# Patient Record
Sex: FEMALE | Race: WHITE | NOT HISPANIC OR LATINO | Employment: STUDENT | URBAN - METROPOLITAN AREA
[De-identification: names, ages, dates, MRNs, and addresses within clinical notes are randomized per-mention and may not be internally consistent; named-entity substitution may affect disease eponyms.]

---

## 2017-08-04 ENCOUNTER — HOSPITAL ENCOUNTER (EMERGENCY)
Facility: HOSPITAL | Age: 22
Discharge: HOME | End: 2017-08-04
Payer: COMMERCIAL

## 2017-08-04 VITALS
HEART RATE: 122 BPM | TEMPERATURE: 99.8 F | RESPIRATION RATE: 24 BRPM | SYSTOLIC BLOOD PRESSURE: 134 MMHG | OXYGEN SATURATION: 98 % | DIASTOLIC BLOOD PRESSURE: 78 MMHG | WEIGHT: 104 LBS

## 2017-08-04 DIAGNOSIS — S61.411A LACERATION OF RIGHT HAND: Primary | ICD-10-CM

## 2017-08-04 PROCEDURE — 99282 EMERGENCY DEPT VISIT SF MDM: CPT

## 2017-08-04 RX ORDER — LIDOCAINE HYDROCHLORIDE 10 MG/ML
10 INJECTION, SOLUTION EPIDURAL; INFILTRATION; INTRACAUDAL; PERINEURAL ONCE
Status: COMPLETED | OUTPATIENT
Start: 2017-08-04 | End: 2017-08-04

## 2017-08-04 RX ORDER — GINSENG 100 MG
1 CAPSULE ORAL ONCE
Status: COMPLETED | OUTPATIENT
Start: 2017-08-04 | End: 2017-08-04

## 2017-08-04 RX ADMIN — LIDOCAINE HYDROCHLORIDE 10 ML: 10 INJECTION, SOLUTION EPIDURAL; INFILTRATION; INTRACAUDAL; PERINEURAL at 13:30

## 2017-08-04 RX ADMIN — BACITRACIN ZINC 1 SMALL APPLICATION: 500 OINTMENT TOPICAL at 13:30

## 2018-01-16 NOTE — PROGRESS NOTES
Chief Complaint  PPD READ NEGATIVE 0 00MM BH/LPN      Active Problems    1   Encounter for PPD test (V74 1) (Z11 1)    Signatures   Electronically signed by : MAHESH Toirbio ; Jan 29 2016  7:23AM EST                       (Author)

## 2023-07-25 ENCOUNTER — OFFICE VISIT (OUTPATIENT)
Dept: URGENT CARE | Facility: CLINIC | Age: 28
End: 2023-07-25
Payer: COMMERCIAL

## 2023-07-25 VITALS
RESPIRATION RATE: 18 BRPM | BODY MASS INDEX: 23.03 KG/M2 | HEIGHT: 61 IN | WEIGHT: 122 LBS | HEART RATE: 71 BPM | OXYGEN SATURATION: 99 % | TEMPERATURE: 98.4 F

## 2023-07-25 DIAGNOSIS — H61.23 BILATERAL IMPACTED CERUMEN: Primary | ICD-10-CM

## 2023-07-25 PROCEDURE — 69209 REMOVE IMPACTED EAR WAX UNI: CPT | Performed by: PHYSICIAN ASSISTANT

## 2023-07-25 PROCEDURE — 99213 OFFICE O/P EST LOW 20 MIN: CPT | Performed by: PHYSICIAN ASSISTANT

## 2023-07-25 NOTE — PROGRESS NOTES
[facility]        NAME: Asia Delcid is a 32 y.o. female  : 1995    MRN: [id number]  DATE: 2023  TIME: 5:29 PM    Assessment and Plan   Bilateral impacted cerumen [H61.23]  1. Bilateral impacted cerumen              Patient Instructions   Patient Instructions   Wax removed today        Follow up with PCP in 3-5 days. Proceed to  ER if symptoms worsen. Chief Complaint     Chief Complaint   Patient presents with   • Cerumen Impaction     Left ear cerumen impaction used Debrox Gtts but couldn't clear it. History of Present Illness       The patient is a 29-year-old female presenting today for cerumen impaction B/L. She has tried using Debrox drops over-the-counter but feels like that made it worse. Review of Systems   Review of Systems   Constitutional: Negative for activity change, appetite change, chills, fatigue and fever. HENT: Positive for hearing loss. Negative for congestion, ear discharge, ear pain, rhinorrhea, sinus pressure, sinus pain and sore throat. Eyes: Negative for pain and visual disturbance. Respiratory: Negative for cough, chest tightness and shortness of breath. Cardiovascular: Negative for chest pain and palpitations. Gastrointestinal: Negative for abdominal pain, diarrhea, nausea and vomiting. Genitourinary: Negative for dysuria and hematuria. Musculoskeletal: Negative for arthralgias, back pain and myalgias. Skin: Negative for color change, pallor and rash. Neurological: Negative for seizures, syncope and headaches. All other systems reviewed and are negative.         Current Medications       Current Outpatient Medications:   •  norethindrone-ethinyl estradiol-iron (ESTROSTEP FE) 1-20/1-30/1-35 MG-MCG TABS, Take by mouth daily (Patient not taking: Reported on 2023), Disp: , Rfl:     Current Allergies     Allergies as of 2023   • (No Known Allergies)            The following portions of the patient's history were reviewed and updated as appropriate: allergies, current medications, past family history, past medical history, past social history, past surgical history and problem list.     No past medical history on file. No past surgical history on file. No family history on file. Medications have been verified. Objective   Pulse 71   Temp 98.4 °F (36.9 °C)   Resp 18   Ht 5' 1" (1.549 m)   Wt 55.3 kg (122 lb)   LMP 07/20/2023   SpO2 99%   BMI 23.05 kg/m²        Physical Exam     Physical Exam  Vitals and nursing note reviewed. Constitutional:       General: She is not in acute distress. Appearance: Normal appearance. She is normal weight. She is not ill-appearing, toxic-appearing or diaphoretic. HENT:      Right Ear: There is impacted cerumen. Left Ear: There is impacted cerumen. Cardiovascular:      Rate and Rhythm: Normal rate and regular rhythm. Pulmonary:      Effort: Pulmonary effort is normal.      Breath sounds: Normal breath sounds. Musculoskeletal:         General: Normal range of motion. Skin:     General: Skin is warm. Neurological:      Mental Status: She is alert. Ear cerumen removal    Date/Time: 7/25/2023 4:30 PM    Performed by: Sangita Persaud PA-C  Authorized by: Sangita Persaud PA-C  Universal Protocol:  Procedure performed by: Myles CHAPMAN)  Consent: Verbal consent obtained.   Risks and benefits: risks, benefits and alternatives were discussed  Consent given by: patient  Patient understanding: patient states understanding of the procedure being performed  Patient consent: the patient's understanding of the procedure matches consent given  Procedure consent: procedure consent matches procedure scheduled  Patient identity confirmed: verbally with patient      Patient location:  Clinic  Procedure details:     Local anesthetic:  None    Location:  L ear and R ear    Procedure type: irrigation only      Approach:  External  Post-procedure details: Complication:  None    Hearing quality:  Improved    Patient tolerance of procedure:   Tolerated well, no immediate complications

## 2024-02-27 ENCOUNTER — APPOINTMENT (OUTPATIENT)
Dept: RADIOLOGY | Facility: CLINIC | Age: 29
End: 2024-02-27
Payer: COMMERCIAL

## 2024-02-27 ENCOUNTER — OFFICE VISIT (OUTPATIENT)
Dept: URGENT CARE | Facility: CLINIC | Age: 29
End: 2024-02-27
Payer: COMMERCIAL

## 2024-02-27 VITALS
SYSTOLIC BLOOD PRESSURE: 100 MMHG | RESPIRATION RATE: 18 BRPM | OXYGEN SATURATION: 100 % | TEMPERATURE: 98.4 F | WEIGHT: 123 LBS | DIASTOLIC BLOOD PRESSURE: 68 MMHG | BODY MASS INDEX: 23.22 KG/M2 | HEIGHT: 61 IN | HEART RATE: 86 BPM

## 2024-02-27 DIAGNOSIS — M77.8 TENDINITIS OF LEFT TRICEPS: Primary | ICD-10-CM

## 2024-02-27 DIAGNOSIS — M25.522 ELBOW PAIN, LEFT: ICD-10-CM

## 2024-02-27 PROCEDURE — 73080 X-RAY EXAM OF ELBOW: CPT

## 2024-02-27 PROCEDURE — 99213 OFFICE O/P EST LOW 20 MIN: CPT | Performed by: FAMILY MEDICINE

## 2024-02-27 RX ORDER — VALACYCLOVIR HYDROCHLORIDE 500 MG/1
500 TABLET, FILM COATED ORAL 2 TIMES DAILY PRN
COMMUNITY

## 2024-02-27 RX ORDER — METHYLPREDNISOLONE 4 MG/1
TABLET ORAL
Qty: 21 EACH | Refills: 0 | Status: SHIPPED | OUTPATIENT
Start: 2024-02-27

## 2024-02-27 NOTE — LETTER
February 27, 2024     Patient: Rhea Diaz   YOB: 1995   Date of Visit: 2/27/2024       To Whom It May Concern:    hRea Diaz was evaluated in my office on 2/27/2024.  Sustained a left triceps tendinitis and has initiated treatment.  Will require a sling for her left arm for about 1 week.  For this period of time, she should avoid lifting anything over 5 pounds with her left hand or doing any work that requires frequent elbow movement.  If you have any questions or concerns, please don't hesitate to call.         Sincerely,        Elvis Ly MD

## 2024-02-27 NOTE — PROGRESS NOTES
[facility]        NAME: Rhea Diaz is a 28 y.o. female  : 1995    MRN: [id number]  DATE: 2024  TIME: 5:35 PM    Assessment and Plan   Tendinitis of left triceps [M77.8]  1. Tendinitis of left triceps  XR elbow 3+ vw left    methylPREDNISolone 4 MG tablet therapy pack    Ambulatory Referral to Orthopedic Surgery        Left elbow x-ray with a dome-like, posterior, supracondylar bony projection on the lateral view; official read pending.  Bony projection likely chronic and causing a triceps tendinitis.  Will rest the left elbow in a sling and treat with Medrol Dosepak and icing.  Referred to Ortho for further evaluation and management.    Patient Instructions     Follow up with PCP in 3-5 days.  Proceed to  ER if symptoms worsen.    Chief Complaint     Chief Complaint   Patient presents with    Elbow Pain     X 1 MONTH NOTES l ELBOW PAIN (POSTERIOR ASPECT) WHEN EXTENDS. TRIED ELBOW WRAP AND TAKING ALEVE PRN AND ICE. IS R HAND DOMINANT.          History of Present Illness       28-year-old right-hand-dominant female presents today due to a left elbow strain first developed about 1 month ago which has persisted despite taking Aleve, icing and Ace wrapping.  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  Denies overt pain at rest, but feels a burning sensation with active extension adjacent to the left olecranon.  Denies any distal paresthesias.    Elbow Pain  Associated symptoms include arthralgias. Pertinent negatives include no chest pain, chills, coughing, fever, joint swelling, rash or weakness.       Review of Systems   Review of Systems   Constitutional:  Negative for chills and fever.   Respiratory:  Negative for cough.    Cardiovascular:  Negative for chest pain.   Musculoskeletal:  Positive for arthralgias. Negative for joint swelling.   Skin:  Negative for rash.   Neurological:  Negative for weakness.         Current  "Medications       Current Outpatient Medications:     methylPREDNISolone 4 MG tablet therapy pack, Use as directed on package, Disp: 21 each, Rfl: 0    valACYclovir (Valtrex) 500 mg tablet, Take 500 mg by mouth 2 (two) times a day as needed, Disp: , Rfl:     Current Allergies     Allergies as of 02/27/2024    (No Known Allergies)            The following portions of the patient's history were reviewed and updated as appropriate: allergies, current medications, past family history, past medical history, past social history, past surgical history and problem list.     History reviewed. No pertinent past medical history.    History reviewed. No pertinent surgical history.    No family history on file.      Medications have been verified.        Objective   /68   Pulse 86   Temp 98.4 °F (36.9 °C)   Resp 18   Ht 5' 1\" (1.549 m)   Wt 55.8 kg (123 lb)   LMP 02/24/2024 (Exact Date)   SpO2 100%   BMI 23.24 kg/m²   Patient's last menstrual period was 02/24/2024 (exact date).       Physical Exam     Physical Exam  Vitals and nursing note reviewed.   Constitutional:       General: She is in acute distress.      Appearance: Normal appearance. She is normal weight. She is not ill-appearing, toxic-appearing or diaphoretic.   HENT:      Head: Normocephalic and atraumatic.   Eyes:      General:         Right eye: No discharge.         Left eye: No discharge.      Conjunctiva/sclera: Conjunctivae normal.   Pulmonary:      Effort: Pulmonary effort is normal.      Breath sounds: Normal breath sounds.   Abdominal:      General: Abdomen is flat.   Musculoskeletal:         General: Signs of injury present. No swelling or tenderness.      Comments: Full active ROM of the left elbow with some apprehension with full extension.  No pain or tenderness involving the epicondyles even with resisted wrist flexion and extension.  Pain reproduced with resisted elbow extension.  Denies the sensation of any joint obstruction.   Skin:     " General: Skin is warm.      Findings: No erythema.   Neurological:      General: No focal deficit present.      Mental Status: She is alert and oriented to person, place, and time.   Psychiatric:         Mood and Affect: Mood normal.         Behavior: Behavior normal.         Thought Content: Thought content normal.         Judgment: Judgment normal.

## 2024-02-28 ENCOUNTER — TELEPHONE (OUTPATIENT)
Age: 29
End: 2024-02-28

## 2024-02-28 NOTE — TELEPHONE ENCOUNTER
Patient was calling to schedule a np appt in [place]. Daly came up with poor connection unable to hear patient call disconnected

## 2024-03-04 ENCOUNTER — OFFICE VISIT (OUTPATIENT)
Dept: OBGYN CLINIC | Facility: CLINIC | Age: 29
End: 2024-03-04
Payer: COMMERCIAL

## 2024-03-04 VITALS
HEIGHT: 61 IN | BODY MASS INDEX: 23.22 KG/M2 | SYSTOLIC BLOOD PRESSURE: 101 MMHG | WEIGHT: 123 LBS | DIASTOLIC BLOOD PRESSURE: 68 MMHG | HEART RATE: 70 BPM

## 2024-03-04 DIAGNOSIS — M77.8 TENDINITIS OF LEFT TRICEPS: ICD-10-CM

## 2024-03-04 PROCEDURE — 99203 OFFICE O/P NEW LOW 30 MIN: CPT | Performed by: ORTHOPAEDIC SURGERY

## 2024-03-04 NOTE — PROGRESS NOTES
Assessment/Plan:  1. Tendinitis of left triceps  Ambulatory Referral to Orthopedic Surgery        Scribe Attestation      I,:  Meli Abreu am acting as a scribe while in the presence of the attending physician.:       I,:  Oniel Lee MD personally performed the services described in this documentation    as scribed in my presence.:             28 y.o. with acute left elbow pain. Upon review of the left elbow x-ray, a thorough history and my examination, Rhea is presenting with signs and symptoms consistent with triceps tendonitis. Diagnosis, treatment options and associated risks were discussed with the patient including no treatment, nonsurgical treatment and potential for surgical intervention.  The patient was given the opportunity to ask questions regarding each. There was a bony prominence seen on the lateral view at the time patient was seen at Urgent Care.  Though this is possibly a benign bony lesion such as an osteochondroma, I feel it is more likely due to the angle of the x-ray.  Either way I did recommend repeat x-ray in 3 months to evaluate this area.  In the meantime I recommend she discontinue use of sling, activities as tolerated. Discussed use of voltaren gel and aleve as needed. Patient would like to hold off on physical therapy at this time. Follow up in 3 months we will repeat x-ray on arrival.         Subjective:   Rhea Diaz is a 28 y.o. female who presents for initial evaluation of left elbow pain. Pain started approximately 1 month ago with out injury or trauma. Pain is localized to the posterior elbow, aggravated with elbow extension. Patient was seen at Urgent Care 2/27/2024, x-rays were obtained demonstrating no acute osseous abnormalities. Urgent care placed patient in a sling for 1 week and prescribed medrol dose pack.  Today patient notes continued posterior elbow pain most aggravated when she extends her arm. Pain does not radiate. She denies  limitations in range of motion or strength. Denies numbness or paresthesias.       Review of Systems   Constitutional:  Negative for chills and fever.   HENT:  Negative for ear pain and sore throat.    Eyes:  Negative for pain and visual disturbance.   Respiratory:  Negative for cough and shortness of breath.    Cardiovascular:  Negative for chest pain and palpitations.   Gastrointestinal:  Negative for abdominal pain and vomiting.   Genitourinary:  Negative for dysuria and hematuria.   Musculoskeletal:  Negative for arthralgias and back pain.   Skin:  Negative for color change and rash.   Neurological:  Negative for seizures and syncope.   All other systems reviewed and are negative.        History reviewed. No pertinent past medical history.    History reviewed. No pertinent surgical history.    No family history on file.    Social History     Occupational History    Not on file   Tobacco Use    Smoking status: Never    Smokeless tobacco: Not on file   Substance and Sexual Activity    Alcohol use: Yes     Comment: socially    Drug use: No    Sexual activity: Not on file         Current Outpatient Medications:     valACYclovir (Valtrex) 500 mg tablet, Take 500 mg by mouth 2 (two) times a day as needed, Disp: , Rfl:     methylPREDNISolone 4 MG tablet therapy pack, Use as directed on package (Patient not taking: Reported on 3/4/2024), Disp: 21 each, Rfl: 0    No Known Allergies    Objective:  Vitals:    03/04/24 1136   BP: 101/68   Pulse: 70       Left Elbow Exam     Tenderness   The patient is experiencing no tenderness.     Range of Motion   Extension:  0   Flexion:  140   Pronation:  20     Muscle Strength   Pronation:  5/5   Supination:  5/5     Tests   Varus: negative  Valgus: negative    Other   Erythema: absent  Sensation: normal  Pulse: present    Comments:  5/5 strength in flexion and extension  No anatomical deformity  Skin is warm and dry to touch with no signs of erythema, ecchymosis, or infection   no  soft tissue swelling or effusion noted  no palpable crepitus with passive motion  MMT: 5/5 throughout  Demonstrates normal shoulder, wrist, and finger motion  no radial head instability  no ulnar nerve subluxation  2+ distal radial pulse with brisk capillary refill to the fingers  Radial, median, and ulnar motor and sensory distrubution intact  Sensation to light touch intact distally               Physical Exam  Vitals and nursing note reviewed.   HENT:      Head: Normocephalic.   Eyes:      Extraocular Movements: Extraocular movements intact.   Cardiovascular:      Rate and Rhythm: Normal rate.      Pulses: Normal pulses.   Pulmonary:      Effort: Pulmonary effort is normal.   Musculoskeletal:         General: Normal range of motion.      Cervical back: Normal range of motion.   Skin:     General: Skin is warm and dry.   Neurological:      General: No focal deficit present.      Mental Status: She is alert.   Psychiatric:         Behavior: Behavior normal.         I have personally reviewed pertinent films in PACS and my interpretation is as follows:    X-rays of the left elbow obtained 2/27/2024 demonstrate no acute osseous abnormalities      This document was created using speech voice recognition software.   Grammatical errors, random word insertions, pronoun errors, and incomplete sentences are an occasional consequence of this system due to software limitations, ambient noise, and hardware issues.   Any formal questions or concerns about content, text, or information contained within the body of this dictation should be directly addressed to the provider for clarification.

## 2024-03-04 NOTE — LETTER
March 4, 2024     Patient: Rhea Diaz  YOB: 1995  Date of Visit: 3/4/2024      To Whom it May Concern:    Rhea Diaz is under my professional care. Rhea Valdovinos was seen in my office on 3/4/2024. Rhea Valdovinos may return to work with out restriction.     If you have any questions or concerns, please don't hesitate to call.         Sincerely,          Oniel Lee MD        CC: No Recipients

## 2024-04-03 ENCOUNTER — APPOINTMENT (OUTPATIENT)
Dept: URGENT CARE | Facility: MEDICAL CENTER | Age: 29
End: 2024-04-03

## 2024-04-03 ENCOUNTER — APPOINTMENT (OUTPATIENT)
Dept: PHYSICAL THERAPY | Facility: MEDICAL CENTER | Age: 29
End: 2024-04-03

## 2024-04-03 PROCEDURE — 97530 THERAPEUTIC ACTIVITIES: CPT | Performed by: PHYSICAL THERAPIST

## 2024-06-03 ENCOUNTER — OFFICE VISIT (OUTPATIENT)
Dept: OBGYN CLINIC | Facility: CLINIC | Age: 29
End: 2024-06-03
Payer: COMMERCIAL

## 2024-06-03 ENCOUNTER — APPOINTMENT (OUTPATIENT)
Dept: RADIOLOGY | Facility: CLINIC | Age: 29
End: 2024-06-03
Payer: COMMERCIAL

## 2024-06-03 VITALS — HEIGHT: 61 IN | BODY MASS INDEX: 23.22 KG/M2 | WEIGHT: 123 LBS

## 2024-06-03 DIAGNOSIS — M77.8 TENDINITIS OF LEFT TRICEPS: Primary | ICD-10-CM

## 2024-06-03 DIAGNOSIS — M77.8 TENDINITIS OF LEFT TRICEPS: ICD-10-CM

## 2024-06-03 PROCEDURE — 73080 X-RAY EXAM OF ELBOW: CPT

## 2024-06-03 PROCEDURE — 99212 OFFICE O/P EST SF 10 MIN: CPT | Performed by: ORTHOPAEDIC SURGERY

## 2024-06-03 NOTE — PROGRESS NOTES
Assessment/Plan:  1. Tendinitis of left triceps  XR elbow 3+ vw left          The patient has no symptoms today and complete normal exam.  Repeat x-ray was reviewed and shows stable appearance.  The prominence of the distal humerus has no change in appearance from previously.  I reviewed again that I believe that this is a normal part of her anatomy and nothing to be concerned about.  She will follow-up as needed in the future.        Subjective:   Rhea Diaz is a 28 y.o. female who turns for repeat x-ray of the elbow.  At last visit she had a normal x-ray of the elbow but there was 1 area concerning for exostosis of the distal humerus.  At that time it was likely due to rotation of the x-ray but we opted to repeat x-ray just to be sure that there is no change over time.    Review of Systems   Constitutional:  Negative for chills and fever.   HENT:  Negative for ear pain and sore throat.    Eyes:  Negative for pain and visual disturbance.   Respiratory:  Negative for cough and shortness of breath.    Cardiovascular:  Negative for chest pain and palpitations.   Gastrointestinal:  Negative for abdominal pain and vomiting.   Genitourinary:  Negative for dysuria and hematuria.   Musculoskeletal:  Negative for arthralgias and back pain.   Skin:  Negative for color change and rash.   Neurological:  Negative for seizures and syncope.   All other systems reviewed and are negative.        History reviewed. No pertinent past medical history.    History reviewed. No pertinent surgical history.    History reviewed. No pertinent family history.    Social History     Occupational History    Not on file   Tobacco Use    Smoking status: Never    Smokeless tobacco: Not on file   Substance and Sexual Activity    Alcohol use: Yes     Comment: socially    Drug use: No    Sexual activity: Not on file         Current Outpatient Medications:     valACYclovir (Valtrex) 500 mg tablet, Take 500 mg by mouth 2 (two) times a day  as needed, Disp: , Rfl:     methylPREDNISolone 4 MG tablet therapy pack, Use as directed on package (Patient not taking: Reported on 3/4/2024), Disp: 21 each, Rfl: 0    No Known Allergies    Objective:  There were no vitals filed for this visit.      Left Elbow Exam     Tenderness   The patient is experiencing no tenderness.     Range of Motion   Extension:  0   Flexion:  140   Pronation:  20     Muscle Strength   Pronation:  5/5   Supination:  5/5     Tests   Varus: negative  Valgus: negative    Other   Erythema: absent  Sensation: normal  Pulse: present    Comments:  5/5 strength in flexion and extension  No anatomical deformity  Skin is warm and dry to touch with no signs of erythema, ecchymosis, or infection   no soft tissue swelling or effusion noted  no palpable crepitus with passive motion  MMT: 5/5 throughout  Demonstrates normal shoulder, wrist, and finger motion  no radial head instability  no ulnar nerve subluxation  2+ distal radial pulse with brisk capillary refill to the fingers  Radial, median, and ulnar motor and sensory distrubution intact  Sensation to light touch intact distally               Physical Exam  Vitals and nursing note reviewed.   HENT:      Head: Normocephalic.   Eyes:      Extraocular Movements: Extraocular movements intact.   Cardiovascular:      Rate and Rhythm: Normal rate.      Pulses: Normal pulses.   Pulmonary:      Effort: Pulmonary effort is normal.   Musculoskeletal:         General: Normal range of motion.      Cervical back: Normal range of motion.   Skin:     General: Skin is warm and dry.   Neurological:      General: No focal deficit present.      Mental Status: She is alert.   Psychiatric:         Behavior: Behavior normal.         I have personally reviewed pertinent films in PACS and my interpretation is as follows:    X-rays of the left elbow obtained 2/27/2024 demonstrate no acute osseous abnormalities      This document was created using speech voice recognition  software.   Grammatical errors, random word insertions, pronoun errors, and incomplete sentences are an occasional consequence of this system due to software limitations, ambient noise, and hardware issues.   Any formal questions or concerns about content, text, or information contained within the body of this dictation should be directly addressed to the provider for clarification.